# Patient Record
Sex: FEMALE | Race: WHITE | Employment: FULL TIME | URBAN - METROPOLITAN AREA
[De-identification: names, ages, dates, MRNs, and addresses within clinical notes are randomized per-mention and may not be internally consistent; named-entity substitution may affect disease eponyms.]

---

## 2017-03-06 ENCOUNTER — HOSPITAL ENCOUNTER (OUTPATIENT)
Dept: WOMENS IMAGING | Age: 62
Discharge: HOME | End: 2017-03-06
Payer: COMMERCIAL

## 2017-03-06 DIAGNOSIS — Z13.9 SCREENING: ICD-10-CM

## 2017-03-06 PROCEDURE — 77063 BREAST TOMOSYNTHESIS BI: CPT

## 2018-05-03 ENCOUNTER — TELEPHONE (OUTPATIENT)
Dept: SURGERY | Age: 63
End: 2018-05-03

## 2018-05-03 DIAGNOSIS — Z12.31 SCREENING MAMMOGRAM, ENCOUNTER FOR: Primary | ICD-10-CM

## 2018-06-05 ENCOUNTER — HOSPITAL ENCOUNTER (OUTPATIENT)
Dept: WOMENS IMAGING | Age: 63
Discharge: HOME | End: 2018-06-07
Payer: COMMERCIAL

## 2018-06-05 DIAGNOSIS — Z12.31 SCREENING MAMMOGRAM, ENCOUNTER FOR: ICD-10-CM

## 2018-06-05 PROCEDURE — 77067 SCR MAMMO BI INCL CAD: CPT

## 2018-06-07 DIAGNOSIS — R92.8 ABNORMAL MAMMOGRAM OF RIGHT BREAST: Primary | ICD-10-CM

## 2018-06-15 ENCOUNTER — HOSPITAL ENCOUNTER (OUTPATIENT)
Dept: ULTRASOUND IMAGING | Age: 63
Discharge: HOME | End: 2018-06-17
Payer: COMMERCIAL

## 2018-06-15 ENCOUNTER — HOSPITAL ENCOUNTER (OUTPATIENT)
Dept: WOMENS IMAGING | Age: 63
Discharge: HOME | End: 2018-06-17
Payer: COMMERCIAL

## 2018-06-15 DIAGNOSIS — R92.8 ABNORMAL MAMMOGRAM OF RIGHT BREAST: ICD-10-CM

## 2018-06-15 DIAGNOSIS — R92.8 ABNORMAL MAMMOGRAM: ICD-10-CM

## 2018-06-15 PROCEDURE — G0279 TOMOSYNTHESIS, MAMMO: HCPCS

## 2018-06-15 PROCEDURE — 76642 ULTRASOUND BREAST LIMITED: CPT

## 2019-05-17 ENCOUNTER — TELEPHONE (OUTPATIENT)
Dept: SURGERY | Age: 64
End: 2019-05-17

## 2019-05-17 DIAGNOSIS — R92.8 ABNORMAL MAMMOGRAM: Primary | ICD-10-CM

## 2019-06-19 ENCOUNTER — HOSPITAL ENCOUNTER (OUTPATIENT)
Dept: WOMENS IMAGING | Age: 64
Discharge: HOME | End: 2019-06-21
Payer: COMMERCIAL

## 2019-06-19 ENCOUNTER — HOSPITAL ENCOUNTER (OUTPATIENT)
Dept: ULTRASOUND IMAGING | Age: 64
End: 2019-06-19
Payer: COMMERCIAL

## 2019-06-19 DIAGNOSIS — Z12.31 ENCOUNTER FOR SCREENING MAMMOGRAM FOR BREAST CANCER: ICD-10-CM

## 2019-06-19 DIAGNOSIS — R92.8 ABNORMAL MAMMOGRAM: ICD-10-CM

## 2019-06-19 PROCEDURE — 77063 BREAST TOMOSYNTHESIS BI: CPT

## 2020-08-06 ENCOUNTER — HOSPITAL ENCOUNTER (OUTPATIENT)
Dept: WOMENS IMAGING | Age: 65
Discharge: HOME | End: 2020-08-08
Payer: COMMERCIAL

## 2020-08-06 PROCEDURE — 77067 SCR MAMMO BI INCL CAD: CPT

## 2021-08-10 ENCOUNTER — HOSPITAL ENCOUNTER (OUTPATIENT)
Dept: WOMENS IMAGING | Age: 66
Discharge: HOME | End: 2021-08-12
Payer: COMMERCIAL

## 2021-08-10 DIAGNOSIS — Z12.31 ENCOUNTER FOR SCREENING MAMMOGRAM FOR MALIGNANT NEOPLASM OF BREAST: ICD-10-CM

## 2021-08-10 PROCEDURE — 77063 BREAST TOMOSYNTHESIS BI: CPT

## 2022-08-22 ENCOUNTER — HOSPITAL ENCOUNTER (OUTPATIENT)
Dept: WOMENS IMAGING | Age: 67
Discharge: HOME | End: 2022-08-24
Payer: COMMERCIAL

## 2022-08-22 VITALS — HEIGHT: 67 IN

## 2022-08-22 DIAGNOSIS — Z12.31 ENCOUNTER FOR SCREENING MAMMOGRAM FOR BREAST CANCER: ICD-10-CM

## 2022-08-22 DIAGNOSIS — Z12.31 ENCOUNTER FOR MAMMOGRAM TO ESTABLISH BASELINE MAMMOGRAM: ICD-10-CM

## 2022-08-22 PROCEDURE — 77063 BREAST TOMOSYNTHESIS BI: CPT

## 2023-05-15 ENCOUNTER — TRANSCRIBE ORDERS (OUTPATIENT)
Dept: ADMINISTRATIVE | Age: 68
End: 2023-05-15

## 2023-05-15 DIAGNOSIS — Z12.31 ENCOUNTER FOR SCREENING MAMMOGRAM FOR MALIGNANT NEOPLASM OF BREAST: Primary | ICD-10-CM

## 2023-09-27 ENCOUNTER — HOSPITAL ENCOUNTER (OUTPATIENT)
Dept: WOMENS IMAGING | Age: 68
Discharge: HOME | End: 2023-09-29
Attending: INTERNAL MEDICINE
Payer: COMMERCIAL

## 2023-09-27 VITALS — HEIGHT: 67 IN

## 2023-09-27 DIAGNOSIS — Z12.31 ENCOUNTER FOR SCREENING MAMMOGRAM FOR MALIGNANT NEOPLASM OF BREAST: ICD-10-CM

## 2023-09-27 PROCEDURE — 77063 BREAST TOMOSYNTHESIS BI: CPT

## 2024-06-18 ENCOUNTER — TRANSCRIBE ORDERS (OUTPATIENT)
Dept: ADMINISTRATIVE | Age: 69
End: 2024-06-18

## 2024-06-18 DIAGNOSIS — Z12.31 ENCOUNTER FOR SCREENING MAMMOGRAM FOR MALIGNANT NEOPLASM OF BREAST: Primary | ICD-10-CM

## 2024-10-14 ENCOUNTER — HOSPITAL ENCOUNTER (OUTPATIENT)
Dept: WOMENS IMAGING | Age: 69
Discharge: HOME | End: 2024-10-16
Attending: INTERNAL MEDICINE
Payer: COMMERCIAL

## 2024-10-14 DIAGNOSIS — Z12.31 ENCOUNTER FOR SCREENING MAMMOGRAM FOR MALIGNANT NEOPLASM OF BREAST: ICD-10-CM

## 2024-10-14 PROCEDURE — 77063 BREAST TOMOSYNTHESIS BI: CPT

## 2024-11-27 ENCOUNTER — APPOINTMENT (OUTPATIENT)
Dept: SURGERY | Facility: CLINIC | Age: 69
End: 2024-11-27
Payer: COMMERCIAL

## 2024-11-27 VITALS
TEMPERATURE: 97.6 F | DIASTOLIC BLOOD PRESSURE: 75 MMHG | SYSTOLIC BLOOD PRESSURE: 110 MMHG | HEART RATE: 65 BPM | WEIGHT: 221.8 LBS

## 2024-11-27 DIAGNOSIS — E04.2 MULTINODULAR THYROID: Primary | ICD-10-CM

## 2024-11-27 PROCEDURE — 99205 OFFICE O/P NEW HI 60 MIN: CPT | Performed by: SURGERY

## 2024-11-27 PROCEDURE — 1159F MED LIST DOCD IN RCRD: CPT | Performed by: SURGERY

## 2024-11-27 PROCEDURE — 1160F RVW MEDS BY RX/DR IN RCRD: CPT | Performed by: SURGERY

## 2024-11-27 PROCEDURE — 1036F TOBACCO NON-USER: CPT | Performed by: SURGERY

## 2024-11-27 RX ORDER — ACETAMINOPHEN 500 MG
1 TABLET ORAL
COMMUNITY

## 2024-11-27 RX ORDER — MELOXICAM 15 MG/1
15 TABLET ORAL
COMMUNITY
Start: 2024-02-13

## 2024-11-27 RX ORDER — LIOTHYRONINE SODIUM 5 UG/1
5 TABLET ORAL
COMMUNITY

## 2024-11-27 RX ORDER — MECLIZINE HYDROCHLORIDE 25 MG/1
1 TABLET ORAL EVERY 8 HOURS PRN
COMMUNITY
Start: 2024-02-14

## 2024-11-27 RX ORDER — LEVOTHYROXINE SODIUM 25 UG/1
25 TABLET ORAL DAILY
COMMUNITY

## 2024-11-27 RX ORDER — MV-MIN/FA/VIT K/LUTEIN/ZEAXANT 200MCG-5MG
CAPSULE ORAL
COMMUNITY

## 2024-11-27 ASSESSMENT — ENCOUNTER SYMPTOMS
RESPIRATORY NEGATIVE: 1
CONSTITUTIONAL NEGATIVE: 1
PSYCHIATRIC NEGATIVE: 1
ENDOCRINE NEGATIVE: 1
NEUROLOGICAL NEGATIVE: 1
EYES NEGATIVE: 1
CARDIOVASCULAR NEGATIVE: 1
MUSCULOSKELETAL NEGATIVE: 1

## 2024-11-27 NOTE — PROGRESS NOTES
Subjective   Patient ID: Jacinda Ordaz is a 69 y.o. female who presents for second opinion on multinodular thyroid gland with atypical biopsy.    HPI Mrs. Ordaz is a pleasant 69-year-old woman with about a 4-year history of nodular thyroid disease.  More recently in 2024 she had an episode of dizziness and was evaluated for possible TIA which was negative.  She had an MRI of her neck and brain which also revealed her multinodular thyroid gland.  This prompted an updated thyroid ultrasound.  On her most recent ultrasound that show she has a multinodular thyroid gland with at least 3 nodules present.  Right lobe 2.3 cm TI-RADS 4 which was slightly increased from previous.  Second nodule 1.6 cm TI-RADS 5 which was a new nodule not previously seen.  Left lobe 1.9 cm TI-RADS 4 slightly smaller than previous.    Based on this she underwent ultrasound-guided biopsy of the 2 right sided thyroid nodules at the [facility].  Both of them came back [place] class III atypia of undetermined significance.  Based on that she was sent out for reflex molecular testing with [facility].  By gene  both came back as suspicious for at least a 50% risk of cancer.    She met with Dr. Twila Whitaker of endocrine surgery at the [facility] who recommended at least a right thyroid lobectomy possible total thyroidectomy for her.  She came to us today for second opinion.    She currently denies any neck pain no difficulty breathing or swallowing no troubles with her voice.  No personal history of head neck or chest radiation exposure.    She has a personal history of hypothyroidism.  Her antithyroid peroxidase antibody was strongly positive at greater than 900 for Hashimoto's thyroiditis is the source of her hypothyroidism.  She is on thyroid hormone replacement with both T3 and T4 replacement under the care of an endocrinologist at the [facility].    Family history-her daughter actually underwent surgery for thyroid  cancer and then about 14 years afterwards had a recurrence and had to go back for second surgery.  We do not have records on the type of cancer that her daughter had or if she underwent a total thyroidectomy or a partial thyroidectomy at her initial operation.    Review of Systems   Constitutional: Negative.    HENT: Negative.     Eyes: Negative.    Respiratory: Negative.     Cardiovascular: Negative.    Endocrine: Negative.    Musculoskeletal: Negative.    Neurological: Negative.    Psychiatric/Behavioral: Negative.         Objective   Physical Exam  Vitals reviewed.   Constitutional:       Appearance: Normal appearance. She is obese.   Eyes:      Comments: No proptosis   Neck:      Vascular: No carotid bruit.      Comments: Her thyroid gland feels mildly enlarged.  2 cm palpable nodule in the right thyroid lobe which moves easily upon swallowing.  No localized fixation.  Difficult to palpate a discrete left-sided thyroid nodule.  Trachea midline.  Cardiovascular:      Rate and Rhythm: Normal rate and regular rhythm.      Heart sounds: Normal heart sounds.   Pulmonary:      Effort: Pulmonary effort is normal. No respiratory distress.      Breath sounds: Normal breath sounds. No wheezing or rales.   Musculoskeletal:         General: Swelling present. Normal range of motion.      Comments: She does have some mild pitting edema of her bilateral lower extremities.  She says she does not take any water pills but has been recommend to wear compression stockings which she does occasionally.  I recommended that she use these more frequently especially before surgery.   Lymphadenopathy:      Cervical: No cervical adenopathy.   Skin:     General: Skin is warm.   Neurological:      General: No focal deficit present.      Mental Status: She is alert and oriented to person, place, and time.   Psychiatric:         Mood and Affect: Mood normal.         Behavior: Behavior normal.         US THYROID/PARATHYROID  Order:  [id number]  Impression    IMPRESSION:    THYROID NODULES ARE PRESENT.  FINE NEEDLE ASPIRATION IS RECOMMENDED FOR  ONE OR MORE NODULES AS DETAILED IN THE SYNOPTIC REPORT.    TI-RADS Category: TR5    ACR Recommendation: TI-RADS 5 nodule.  FNA is advised.    ACR recommendations are strictly based on the size and imaging appearance  at the time of the exam and do not consider stability or previous biopsy  results.            : VANNA    Transcribe Date/Time: Mar  8 2024 10:43A    Dictated by : MICHELLE BAÑUELOS MD    This examination was interpreted and the report reviewed and  electronically signed by:  MICHELLE BAÑUELOS MD on Mar  8 2024  1:50PM  EST  Narrative    * * *Final Report* * *    DATE OF EXAM: Mar  8 2024  9:13AM      [facility]   [id number]  -  US THYROID/PARATHYROID  / ACCESSION #  [id number]    PROCEDURE REASON: Thyroid nodule        * * * * Physician Interpretation * * * *     EXAMINATION:  THYROID ULTRASOUND    CLINICAL HISTORY: Thyroid nodules.    TECHNIQUE:  Sonography and Doppler imaging of the thyroid was performed.    Images were obtained and stored in a permanent archive.    MQ:  [id number]  COMPARISON: CTA neck 2/12/2024, Thyroid ultrasound 5/15/2020.    RESULT:    Right Lobe:  7.0 x 2.8 x 3.1 cm; heterogeneous with numerous nodules,  increased vascular flow on color Doppler imaging.    Left Lobe:  5.6 x 1.6 x 1.2 cm; heterogeneous echogenicity, increased  vascular flow on color Doppler imaging.    Isthmus: 0.6 cm    The most suspicious thyroid nodule(s) (up to four) as below:    NODULE 1:  Location: Right upper pole  Size: 2.3 x 1.7 x 1.1 cm, previously 1.9 x 1.8 x 0.8 cm  Characteristics:       Composition:  Solid or almost completely solid, 2 points       Echogenicity: Hypoechoic, 2 points       Shape:  Wider-than-tall, 0 points       Margin: Smooth, 0 points       Echogenic foci (add points for all that apply):    Macrocalcifications, 1 point       Internal vascularity:  present       Interval growth:  No  significant growth given differences in  technique  TI-RADS Category: TR4  ACR Recommendation: TI-RADS 4 nodule.   FNA is recommended.    NODULE 2:  Location: Right mid posteriorly  Size: 1.4 x 1.5 x 1.6 cm, not previously identified.  Characteristics:       Composition:  Solid or almost completely solid, 2 points       Echogenicity: Hypoechoic, 2 points       Shape:  Taller-than-wide, 3 points       Margin: Smooth, 0 points       Echogenic foci (add points for all that apply):  None, 0 points       Internal vascularity:  present       Interval growth:  Not previously identified.  TI-RADS Category: TR5  ACR Recommendation: TI-RADS 5 nodule.  FNA is advised.    NODULE 3:  Location: Right mid  Size: 2.4 x 1.8 x 1.2 cm, previously 2.1 x 1.8 x 1.1 cm.  Characteristics:       Composition:  Solid or almost completely solid, 2 points       Echogenicity: Hypoechoic, 2 points       Shape:  Wider-than-tall, 0 points       Margin: Ill-defined, 0 points       Echogenic foci (add points for all that apply):    Macrocalcifications, 1 point       Internal vascularity:  present       Interval growth:  Slightly increased in size.  TI-RADS Category: TR4  ACR Recommendation: TI-RADS 4 nodule.   FNA is recommended.    NODULE 4:  Location: Left lower pole  Size: 1.9 x 1.6 x 1.2 cm, previously 2.4 x 1.6 x 1.2 cm.  Characteristics:       Composition:  Composition cannot be determined because of  calcification, 2 points       Echogenicity: Echogenicity cannot be determined, 1 point       Shape:  Wider-than-tall, 0 points       Margin: Margin cannot be determined, 0 points       Echogenic foci (add points for all that apply):  Peripheral (rim)  calcification (complete or incomplete), 2 points       Internal vascularity:  absent       Interval growth:  No significant growth given differences in  technique  TI-RADS Category: TR4  ACR Recommendation: TI-RADS 4 nodule.   FNA is recommended.    Assessment/Plan    Mrs. Ordaz has evidence of a  multinodular thyroid gland.  This has been present for at least about 4 years.  On her most recent ultrasound one of her nodules on the right side increased in size and there was a new nodule not previously seen.  Third nodule on the left was slightly smaller than previous.  All of her nodules were TI-RADS 4 and 5 and could meet criteria for biopsy.    She has a personal history of hypothyroidism and she is already on thyroid hormone replacement.  This is consistent with her diagnosis of Hashimoto's thyroiditis with a positive TPO antibody.    Biopsy of both right sided thyroid nodules came back [place] 3 with atypia of undetermined significance.  She then was sent out for reflex molecular testing which came back suspicious on both giving her about 50% risk of cancer in each nodule that was biopsied.    Plan    1.  I told her that I would certainly agree with Dr. Twila Whitaker who evaluated her at the [facility] that she should undergo surgical resection.  I told her that I would recommend that she get a total thyroidectomy done for 3 reasons.  The first is bilateral thyroid nodules that all would meet criteria for biopsy.  Even if the left side came back benign she is still need to be in long-term ultrasound surveillance.  Secondly, she has a first-degree relative and her daughter with a history of thyroid cancer which does increase her risk of thyroid cancer slightly.  Finally, she is already on thyroid hormone replacement therefore thyroid conservation for the purpose of trying to keep her off medication would not have any bearing for her.    I again reviewed with her the operation and the risks and benefits.  She said that she would like to move forward with surgery and asked if I would be willing to do her surgery for her here at [facility].    I told her we can certainly do that for her.  I will have my nurse give her our office information.  She will call my office after the  Thanksgiving holiday to work on selecting a surgical date.  She would like to do this in January after the [place] holidays which is perfectly reasonable.  We will also make sure she is updated on any lab work and EKG as needed for surgery.         Anderson Abbott MD 11/27/24 9:54 AM

## 2024-12-23 DIAGNOSIS — E04.2 MULTINODULAR THYROID: ICD-10-CM

## 2025-01-09 ENCOUNTER — TELEPHONE (OUTPATIENT)
Dept: SURGERY | Facility: CLINIC | Age: 70
End: 2025-01-09
Payer: COMMERCIAL

## 2025-01-09 NOTE — TELEPHONE ENCOUNTER
Call to patient. No answer. Left voicemail message with names of endocrinologists within the  system as requested. Emailed to address on file also. Callback number provided for further questions/concerns.

## 2025-03-17 ENCOUNTER — HOSPITAL ENCOUNTER (OUTPATIENT)
Dept: RADIOLOGY | Facility: HOSPITAL | Age: 70
Discharge: HOME | End: 2025-03-17
Payer: MEDICARE

## 2025-03-17 DIAGNOSIS — E04.2 MULTINODULAR THYROID: ICD-10-CM

## 2025-03-17 PROCEDURE — 76536 US EXAM OF HEAD AND NECK: CPT

## 2025-03-17 PROCEDURE — 76536 US EXAM OF HEAD AND NECK: CPT | Performed by: RADIOLOGY

## 2025-03-20 ENCOUNTER — TELEPHONE (OUTPATIENT)
Dept: SURGERY | Facility: CLINIC | Age: 70
End: 2025-03-20
Payer: COMMERCIAL

## 2025-03-20 NOTE — TELEPHONE ENCOUNTER
Return call to patient. No answer. Left voicemail message notifying patient that Dr. Abbott had the opportunity to look over her most recent thyroid US as requested. The nodules are stable. However, as discussed at her last clinic visit, he is still recommending surgery based on previous abnormal thyroid biopsy with abnormal genetic testing (which was still potentially suspicious for cancer). Patient encouraged to review with Dr. Miller at appointment in May, or can call us back to further discuss. Callback number provided.

## 2025-05-27 ENCOUNTER — APPOINTMENT (OUTPATIENT)
Dept: ENDOCRINOLOGY | Facility: CLINIC | Age: 70
End: 2025-05-27
Payer: COMMERCIAL

## 2025-05-27 VITALS
HEART RATE: 66 BPM | WEIGHT: 217 LBS | BODY MASS INDEX: 34.06 KG/M2 | SYSTOLIC BLOOD PRESSURE: 120 MMHG | HEIGHT: 67 IN | DIASTOLIC BLOOD PRESSURE: 72 MMHG

## 2025-05-27 DIAGNOSIS — E04.2 MULTINODULAR GOITER: Primary | ICD-10-CM

## 2025-05-27 PROCEDURE — 99205 OFFICE O/P NEW HI 60 MIN: CPT | Performed by: INTERNAL MEDICINE

## 2025-05-27 PROCEDURE — 3008F BODY MASS INDEX DOCD: CPT | Performed by: INTERNAL MEDICINE

## 2025-05-27 PROCEDURE — 1036F TOBACCO NON-USER: CPT | Performed by: INTERNAL MEDICINE

## 2025-05-27 PROCEDURE — 1159F MED LIST DOCD IN RCRD: CPT | Performed by: INTERNAL MEDICINE

## 2025-05-27 NOTE — PROGRESS NOTES
Chief complaint    Evaluation of multinodular goiter    HPI      This is a 69-year-old woman who is known to have multinodular goiter as early as 2020 when she herself first noted a right sided thyroid nodule.  She did not have any follow-up on that nodule until February 2024 when she presented to the emergency room with dizziness and vertigo and had an MRI that showed the nodular goiter. This was followed by ultrasound and it showed 3 nodules present one of them was 2.3 cm was LM was 1.6 and the third 1 was 1.9.  Two of them were biopsied eventually and showed atypia of undetermined significance.  This was followed by molecular testing which  resulted in an estimated cancer of thyroid being at 50%.  She saw an endocrinologist as well as an endocrine surgeon at the [facility] and she was not sure whether to follow their advice.  She sought a second opinion from Dr. Abbott whom she saw in November of last year he recommended the option of complete thyroidectomy and she was hesitant about this procedure.    She denies having any neck pain or discomfort at any time.  She there was no prior history of radiation to the neck or chest area.  She denies having any swallowing issues.    She apparently was thought to have hypothyroidism and has been on thyroxine replacement at the dose of 25 mcg/day this dose was increased to 25 mcg on 5 days of the week and 100 mcg on the remaining 2 days of the week.  She did not feel any changes in her symptoms whether on the 25 daily or the 9 tablets/week.    Her family history significant for the fact that her daughter was diagnosed to have metastatic thyroid cancer and has been living in [place] and has already had radioactive iodine therapy for the cancer.  Prior to the diagnosis of thyroid cancer her daughter was diagnosed to have Graves' disease.    The patient herself had positive antibodies prior to her diagnosis of the goiter a year ago.    Her review of system is  remarkable for a new onset of bleeding more likely related to vaginal bleed even though this was not confirmed. She is postmenopausal and has not seen a gynecologist as of late.  She also has macular degeneration and is on intraocular injections on regular basis.    The review of system was otherwise unremarkable    Exam    She appeared well and in no acute distress she was overweight but did not have any cushingoid features.  Skin was normal to texture/ moisture     The eye examination was unremarkable showing no proptosis or chemosis;  the  extraocular motility was complete and normal.    She did not have any neck bruits and her thyroid was enlarged with 2 palpable nodules 1 on the right and 1 on the left the texture of the gland was quite firm and the trachea was midline.  There was a small palpable lymph node on the right side that was minimally tender    The heart lung and abdominal exam were all unremarkable    Her neurologic exam was unremarkable showing normal reflexes without any delay in the relaxation phase she has a positive [place] sign and the remainder of her exam was unremarkable    Impression/plan   Mrs. Ordaz  has clear evidence for a background of autoimmune thyroid disease as evidenced by the positive peroxidase antibody.  Similarly her daughter had the background of graves disease.  The FNA done last year showed features consistent with atypia of undetermined etiology. Of concern however is the molecular testing that showed at least a 50% chance of malignancy.    I discussed in detail the options available for treatment and felt that the most appropriate approach in my opinion would be a total thyroidectomy.  She asked several questions related to the procedure and potential adverse events related to the procedure.  Her questions were address in detail. I suggested that this is not urgent but I would further recommend to proceed with sooner than later.  She will contact Dr. Abbott to  arrange for the procedure at some point.    I was somewhat concerned about the history of bleeding that seem to be likely vaginal in origin I recommended that she sees gynecology as soon as possible to address that she indicated that she will be seeing a urologist at some point but I felt that her description is more consistent with a vaginal bleed.  With her being postmenopausal the concern obviously is uterine and abnormality.  She was quite satisfied with our discussion and will be contacting Dr. Abbott to plan for the surgery at his her convenience. She will need postoperative follow up.       Vazquez Miller MD